# Patient Record
Sex: MALE | Race: WHITE | NOT HISPANIC OR LATINO | Employment: FULL TIME | URBAN - METROPOLITAN AREA
[De-identification: names, ages, dates, MRNs, and addresses within clinical notes are randomized per-mention and may not be internally consistent; named-entity substitution may affect disease eponyms.]

---

## 2019-12-10 ENCOUNTER — OFFICE VISIT (OUTPATIENT)
Dept: URGENT CARE | Age: 44
End: 2019-12-10
Payer: COMMERCIAL

## 2019-12-10 ENCOUNTER — HOSPITAL ENCOUNTER (EMERGENCY)
Facility: HOSPITAL | Age: 44
Discharge: HOME | End: 2019-12-10
Attending: EMERGENCY MEDICINE | Admitting: EMERGENCY MEDICINE
Payer: COMMERCIAL

## 2019-12-10 VITALS
WEIGHT: 177 LBS | RESPIRATION RATE: 20 BRPM | HEART RATE: 64 BPM | TEMPERATURE: 98.2 F | DIASTOLIC BLOOD PRESSURE: 67 MMHG | BODY MASS INDEX: 24.01 KG/M2 | OXYGEN SATURATION: 97 % | SYSTOLIC BLOOD PRESSURE: 114 MMHG

## 2019-12-10 VITALS
RESPIRATION RATE: 20 BRPM | TEMPERATURE: 98.5 F | DIASTOLIC BLOOD PRESSURE: 71 MMHG | HEIGHT: 72 IN | BODY MASS INDEX: 23.98 KG/M2 | OXYGEN SATURATION: 98 % | WEIGHT: 177 LBS | HEART RATE: 63 BPM | SYSTOLIC BLOOD PRESSURE: 122 MMHG

## 2019-12-10 DIAGNOSIS — J34.0 ABSCESS OF NASAL SEPTUM: Primary | ICD-10-CM

## 2019-12-10 DIAGNOSIS — R22.0 NASAL SWELLING: ICD-10-CM

## 2019-12-10 DIAGNOSIS — J34.0 CELLULITIS OF NOSE: Primary | ICD-10-CM

## 2019-12-10 DIAGNOSIS — R22.0 FACIAL SWELLING: ICD-10-CM

## 2019-12-10 DIAGNOSIS — K08.89 PAIN, DENTAL: ICD-10-CM

## 2019-12-10 DIAGNOSIS — R22.0 SWELLING OF GUMS: ICD-10-CM

## 2019-12-10 PROCEDURE — 90715 TDAP VACCINE 7 YRS/> IM: CPT | Performed by: EMERGENCY MEDICINE

## 2019-12-10 PROCEDURE — S9083 URGENT CARE CENTER GLOBAL: HCPCS | Performed by: PHYSICIAN ASSISTANT

## 2019-12-10 PROCEDURE — 99283 EMERGENCY DEPT VISIT LOW MDM: CPT | Performed by: EMERGENCY MEDICINE

## 2019-12-10 PROCEDURE — G0382 LEV 3 HOSP TYPE B ED VISIT: HCPCS | Performed by: PHYSICIAN ASSISTANT

## 2019-12-10 PROCEDURE — 90471 IMMUNIZATION ADMIN: CPT

## 2019-12-10 PROCEDURE — 99283 EMERGENCY DEPT VISIT LOW MDM: CPT

## 2019-12-10 RX ORDER — IBUPROFEN 400 MG/1
800 TABLET ORAL ONCE
Status: COMPLETED | OUTPATIENT
Start: 2019-12-10 | End: 2019-12-10

## 2019-12-10 RX ORDER — NAPROXEN 500 MG/1
500 TABLET ORAL 2 TIMES DAILY WITH MEALS
Qty: 20 TABLET | Refills: 0 | Status: SHIPPED | OUTPATIENT
Start: 2019-12-10 | End: 2019-12-20

## 2019-12-10 RX ORDER — CLINDAMYCIN HYDROCHLORIDE 150 MG/1
300 CAPSULE ORAL 4 TIMES DAILY
Qty: 56 CAPSULE | Refills: 0 | Status: SHIPPED | OUTPATIENT
Start: 2019-12-10 | End: 2019-12-17

## 2019-12-10 RX ORDER — CLINDAMYCIN HYDROCHLORIDE 150 MG/1
300 CAPSULE ORAL ONCE
Status: COMPLETED | OUTPATIENT
Start: 2019-12-10 | End: 2019-12-10

## 2019-12-10 RX ADMIN — CLINDAMYCIN HYDROCHLORIDE 300 MG: 150 CAPSULE ORAL at 23:43

## 2019-12-10 RX ADMIN — IBUPROFEN 800 MG: 400 TABLET ORAL at 23:45

## 2019-12-10 RX ADMIN — TETANUS TOXOID, REDUCED DIPHTHERIA TOXOID AND ACELLULAR PERTUSSIS VACCINE, ADSORBED 0.5 ML: 5; 2.5; 8; 8; 2.5 SUSPENSION INTRAMUSCULAR at 23:44

## 2019-12-11 NOTE — ED PROVIDER NOTES
History  Chief Complaint   Patient presents with    Nose Problem     Pt starting with a white popable area on the right side tip of nose  Pt then noticed redness and swelling around the area on the nose  The swelling goes into his lip/cheek and has pain into his teeth  Patient is a 45year old male who had a pimple on his nose a few days ago and popped it and since then has had increased swelling and redness and pain of distal nose  No fever  No N/V  No sob  ?last Td  Was last seen at [facility] today for nasal abscess and sent to ED for evaluation  Pain radiates to teeth  History provided by:  Patient, relative and spouse (daughter)   used: No        None       History reviewed  No pertinent past medical history  History reviewed  No pertinent surgical history  History reviewed  No pertinent family history  I have reviewed and agree with the history as documented  Social History     Tobacco Use    Smoking status: Current Every Day Smoker     Packs/day: 0 50     Types: Cigarettes    Smokeless tobacco: Never Used   Substance Use Topics    Alcohol use: Never     Frequency: Never    Drug use: Never        Review of Systems   Constitutional: Negative for fever  Respiratory: Negative for shortness of breath  Gastrointestinal: Negative for nausea and vomiting  Skin: Positive for color change  Physical Exam  Physical Exam   Constitutional: He is oriented to person, place, and time  He appears well-developed and well-nourished  He appears distressed (mild)  HENT:   Head: Normocephalic and atraumatic  Right Ear: External ear normal    Left Ear: External ear normal    Mouth/Throat: Oropharynx is clear and moist  No oropharyngeal exudate    (+) bilateral distal nasal swelling and erythema and induration and no distinct fluctuance  Eyes: Pupils are equal, round, and reactive to light  EOM are normal  No scleral icterus  Neck: Normal range of motion  Neck supple  No JVD present  No tracheal deviation present  Cardiovascular: Normal rate, regular rhythm and normal heart sounds  No murmur heard  Pulmonary/Chest: Effort normal and breath sounds normal  No respiratory distress  Neurological: He is alert and oriented to person, place, and time  Skin: Skin is warm and dry  There is erythema (distal bilateral nose/nares)  Nursing note and vitals reviewed  Vital Signs  ED Triage Vitals [12/10/19 2235]   Temperature Pulse Respirations Blood Pressure SpO2   98 2 °F (36 8 °C) 64 20 114/67 97 %      Temp Source Heart Rate Source Patient Position - Orthostatic VS BP Location FiO2 (%)   Oral Monitor Sitting Left arm --      Pain Score       Worst Possible Pain           Vitals:    12/10/19 2235   BP: 114/67   Pulse: 64   Patient Position - Orthostatic VS: Sitting         Visual Acuity      ED Medications  Medications   tetanus-diphtheria-acellular pertussis (BOOSTRIX) IM injection 0 5 mL (has no administration in time range)   ibuprofen (MOTRIN) tablet 800 mg (has no administration in time range)   clindamycin (CLEOCIN) capsule 300 mg (has no administration in time range)       Diagnostic Studies  Results Reviewed     None                 No orders to display              Procedures  Procedures         ED Course                               MDM  Number of Diagnoses or Management Options  Diagnosis management comments: DDX including but not limited to: cellulitis, lymphangitis, folliculitis, carbuncle, abscess; doubt rhabdomyolysis, necrotizing fasciitis, allergic reaction, angioedema          Amount and/or Complexity of Data Reviewed  Decide to obtain previous medical records or to obtain history from someone other than the patient: yes  Obtain history from someone other than the patient: yes  Review and summarize past medical records: yes          Disposition  Final diagnoses:   Cellulitis of nose - possible early abscess     Time reflects when diagnosis was documented in both MDM as applicable and the Disposition within this note     Time User Action Codes Description Comment    12/10/2019 11:30 PM Welby Seip Add [J34 0] Cellulitis of nose     12/10/2019 11:32 PM Welby Seip Modify [J34 0] Cellulitis of nose possible early abscess      ED Disposition     ED Disposition Condition Date/Time Comment    Discharge Stable Tue Dec 10, 2019 11:30 PM Artist Craven discharge to home/self care  Follow-up Information     Follow up With Specialties Details Why Contact Info    Phillip Gonzales MD Family Medicine Call in 2 days  [address] [facility]  [facility] [address] [place] [address]      Michelle Sanches MD Otolaryngology Call in 1 day Warm compresses three times a day  Return sooner if increased swelling, fever, vomiting, red streaks, difficulty swallowing or breathing, increased redness  [place] [address]  [phone]            Patient's Medications   Discharge Prescriptions    CLINDAMYCIN (CLEOCIN) 150 MG CAPSULE    Take 2 capsules (300 mg total) by mouth 4 (four) times a day for 7 days       Start Date: 12/10/2019End Date: 12/17/2019       Order Dose: 300 mg       Quantity: 56 capsule    Refills: 0    NAPROXEN (EC NAPROSYN) 500 MG EC TABLET    Take 1 tablet (500 mg total) by mouth 2 (two) times a day with meals for 10 days       Start Date: 12/10/2019End Date: 12/20/2019       Order Dose: 500 mg       Quantity: 20 tablet    Refills: 0     No discharge procedures on file      ED Provider  Electronically Signed by           Celena Becerra MD  12/10/19 0306

## 2019-12-11 NOTE — PATIENT INSTRUCTIONS
Patient would like to go via private vehicle to [facility] ER  Please go to the [facility] Emergency Department now for further evaluation and treatment- hospital address verified with the patient  Patient agreed to go immediately to the ED

## 2019-12-11 NOTE — PROGRESS NOTES
[facility]        NAME: Anant Leal is a 45 y o  male  : 1975    MRN: [id number]  DATE: December 10, 2019  TIME: 9:24 PM    Assessment and Plan   Abscess of nasal septum [J34 0]  1  Abscess of nasal septum  Transfer to other facility   2  Facial swelling  Transfer to other facility   3  Nasal swelling  Transfer to other facility   4  Pain, dental  Transfer to other facility   5  Swelling of gums  Transfer to other facility     Severe 10/10 nose, face and dental pain  Swelling and drainage  Concern for deep abscess as well as a facial cellulitis    Patient Instructions     Patient would like to go via private vehicle to [facility] ER  Please go to the [facility] Emergency Department now for further evaluation and treatment- hospital address verified with the patient  Patient agreed to go immediately to the ED  Chief Complaint     Chief Complaint   Patient presents with    Facial Pain     ingrown hair on nose and right upper lip and gums are swollen it's been since Yesterday   went to  dentist office and they referral to Oral Surgen   does not have an appointment  yet  History of Present Illness       53-year-old male presents with severe, 10/10 right-sided nose pain, gum pain and facial pain and swelling  He states on  he noticed a bump inside his right nose  States he tried warm compresses, states he has been having purulent drainage  He states the pain has worsened  States now the swelling and pain is over the right side of his face, cheek and right side of his upper lip/gum area  States he went to the dentist today and they referred him to the oral surgeon  Patient states he cannot wait to be seen as a did not have any appointments as he is in severe pain and the pain and swelling is worsening  Denies any fevers or chills  Notes redness to the right side of his nose and face that started today    Does note a history of dental problems and poor dentition  Denies any difficulty breathing or swallowing, chest pain, shortness of breath, GI/ symptoms or other complaints  Review of Systems   Review of Systems   Constitutional: Negative for activity change, appetite change, chills, fatigue and fever  HENT: Positive for dental problem, facial swelling and sinus pain  Negative for sore throat, trouble swallowing and voice change  Eyes: Negative for itching and visual disturbance  Respiratory: Negative for shortness of breath  Cardiovascular: Negative for chest pain  Gastrointestinal: Negative for abdominal pain, diarrhea, nausea and vomiting  Musculoskeletal: Negative for arthralgias and joint swelling  Skin: Positive for rash and wound  Neurological: Negative for dizziness, seizures, weakness, numbness and headaches  All other systems reviewed and are negative  Current Medications     No current outpatient medications on file  Current Allergies     Allergies as of 12/10/2019 - Reviewed 12/10/2019   Allergen Reaction Noted    Amoxicillin  03/22/2015            The following portions of the patient's history were reviewed and updated as appropriate: allergies, current medications, past family history, past medical history, past social history, past surgical history and problem list      No past medical history on file  No past surgical history on file  No family history on file  Medications have been verified  Objective   /71 (BP Location: Right arm, Patient Position: Sitting, Cuff Size: Standard)   Pulse 63   Temp 98 5 °F (36 9 °C) (Temporal)   Resp 20   Ht 6' (1 829 m)   Wt 80 3 kg (177 lb)   SpO2 98%   BMI 24 01 kg/m²        Physical Exam     Physical Exam   Constitutional: He is oriented to person, place, and time  He appears well-developed and well-nourished  No distress     HENT:   Head:       Right Ear: Tympanic membrane normal    Left Ear: Tympanic membrane normal    Nose: Sinus tenderness present  Right sinus exhibits maxillary sinus tenderness  Mouth/Throat: Uvula is midline and oropharynx is clear and moist  Abnormal dentition (Generalized, poor dentition  Missing multiple teeth  Erythematous and swollen gum line to right upper gums)  Dental caries present  No uvula swelling  No tongue/sublingual swelling or airway obstruction  Cardiovascular: Normal rate, regular rhythm and normal heart sounds  Pulmonary/Chest: Effort normal and breath sounds normal  He has no wheezes  Neurological: He is alert and oriented to person, place, and time  NV intact with sensation and strong peripheral pulses  5/5 strength of UE/LE bilaterally   Skin: Skin is warm and dry  Rash noted  Psychiatric: He has a normal mood and affect  His behavior is normal    Nursing note and vitals reviewed

## 2021-06-28 ENCOUNTER — OFFICE VISIT (OUTPATIENT)
Dept: URGENT CARE | Age: 46
End: 2021-06-28

## 2021-06-28 VITALS — RESPIRATION RATE: 18 BRPM | TEMPERATURE: 98.2 F | HEART RATE: 92 BPM | OXYGEN SATURATION: 96 %

## 2021-06-28 DIAGNOSIS — B34.9 ACUTE VIRAL SYNDROME: Primary | ICD-10-CM

## 2021-06-28 PROCEDURE — G0382 LEV 3 HOSP TYPE B ED VISIT: HCPCS | Performed by: NURSE PRACTITIONER

## 2021-06-28 RX ORDER — PREDNISONE 20 MG/1
20 TABLET ORAL 2 TIMES DAILY WITH MEALS
Qty: 6 TABLET | Refills: 0 | Status: SHIPPED | OUTPATIENT
Start: 2021-06-28 | End: 2021-07-01

## 2021-06-28 NOTE — LETTER
June 28, 2021     Patient: Alejandro Arceo   YOB: 1975   Date of Visit: 6/28/2021       To Whom it May Concern:    Alejandro Arceo was seen in my clinic on 6/28/2021  He may return to work 06/29/2021  If you have any questions or concerns, please don't hesitate to call           Sincerely,          ARVIND Tiwari        CC: No Recipients

## 2021-06-28 NOTE — PROGRESS NOTES
[facility]        NAME: David Jay is a 39 y o  male  : 1975    MRN: [id number]  DATE: 2021  TIME: 12:55 PM    Assessment and Plan   Acute viral syndrome [B34 9]  1  Acute viral syndrome  predniSONE 20 mg tablet         Patient Instructions     Take med as directed  Likely vital infection of upper resp tract  Tylenol prn for aches and pain  Follow up with PCP in 3-5 days  Proceed to  ER if symptoms worsen  Chief Complaint     Chief Complaint   Patient presents with    Cough     chill, and bodyaches, x saturday night     Sore Throat    Headache         History of Present Illness       HPI   Reports cough, chills, sore throat and headache, with bodyaches  Started 2 days ago  Had his covid vaccines  Last shot was may 18, 2021  No recent travel  No known contacts with sick persons  Works at a car [employer]  Smoker  Review of Systems   Review of Systems   Constitutional: Positive for chills  Negative for fever  HENT: Positive for sore throat  Negative for congestion, rhinorrhea and trouble swallowing  Respiratory: Positive for cough  Negative for chest tightness, shortness of breath and wheezing  Cardiovascular: Negative for chest pain  Gastrointestinal: Negative for diarrhea, nausea and vomiting  Musculoskeletal: Positive for myalgias  Skin: Negative for rash  Neurological: Positive for headaches  Negative for dizziness           Current Medications       Current Outpatient Medications:     naproxen (EC NAPROSYN) 500 MG EC tablet, Take 1 tablet (500 mg total) by mouth 2 (two) times a day with meals for 10 days, Disp: 20 tablet, Rfl: 0    predniSONE 20 mg tablet, Take 1 tablet (20 mg total) by mouth 2 (two) times a day with meals for 3 days, Disp: 6 tablet, Rfl: 0    Current Allergies     Allergies as of 2021 - Reviewed 2021   Allergen Reaction Noted    Amoxicillin  2015            The following portions of the patient's history were reviewed and updated as appropriate: allergies, current medications, past family history, past medical history, past social history, past surgical history and problem list      History reviewed  No pertinent past medical history  History reviewed  No pertinent surgical history  No family history on file  Medications have been verified  Objective   Pulse 92   Temp 98 2 °F (36 8 °C)   Resp 18   SpO2 96%   No LMP for male patient  Physical Exam     Physical Exam  Constitutional:       Appearance: He is not ill-appearing or diaphoretic  HENT:      Ears:      Comments: Increased cerumen in both ears     Mouth/Throat:      Mouth: Mucous membranes are moist       Pharynx: No pharyngeal swelling or posterior oropharyngeal erythema  Tonsils: No tonsillar exudate  0 on the right  0 on the left  Comments: Post nasal drip  Cardiovascular:      Rate and Rhythm: Regular rhythm  Pulmonary:      Effort: Pulmonary effort is normal       Breath sounds: Normal breath sounds  Lymphadenopathy:      Cervical: No cervical adenopathy  Neurological:      Mental Status: He is alert

## 2021-06-28 NOTE — PATIENT INSTRUCTIONS
Upper Respiratory Infection   WHAT YOU NEED TO KNOW:   An upper respiratory infection is also called a cold  It can affect your nose, throat, ears, and sinuses  Cold symptoms are usually worst for the first 3 to 5 days  Most people get better in 7 to 14 days  You may continue to cough for 2 to 3 weeks  Colds are caused by viruses and do not get better with antibiotics  DISCHARGE INSTRUCTIONS:   Call your local emergency number (911 in the [address] [facility] [facility]) if:   · You have chest pain or trouble breathing  Return to the emergency department if:   · You have a fever over 102ºF (39ºC)  Call your doctor if:   · You have a low fever  · Your sore throat gets worse or you see white or yellow spots in your throat  · Your symptoms get worse after 3 to 5 days or are not better in 14 days  · You have a rash anywhere on your skin  · You have large, tender lumps in your neck  · You have thick, green, or yellow drainage from your nose  · You cough up thick yellow, green, or bloody mucus  · You have a bad earache  · You have questions or concerns about your condition or care  Medicines: You may need any of the following:  · Decongestants  help reduce nasal congestion and help you breathe more easily  If you take decongestant pills, they may make you feel restless or cause problems with your sleep  Do not use decongestant sprays for more than a few days  · Cough suppressants  help reduce coughing  Ask your healthcare provider which type of cough medicine is best for you  · NSAIDs , such as ibuprofen, help decrease swelling, pain, and fever  NSAIDs can cause stomach bleeding or kidney problems in certain people  If you take blood thinner medicine, always ask your healthcare provider if NSAIDs are safe for you  Always read the medicine label and follow directions  · Acetaminophen  decreases pain and fever  It is available without a doctor's order  Ask how much to take and how often to take it  Follow directions  Read the labels of all other medicines you are using to see if they also contain acetaminophen, or ask your doctor or pharmacist  Acetaminophen can cause liver damage if not taken correctly  Do not use more than 4 grams (4,000 milligrams) total of acetaminophen in one day  · Take your medicine as directed  Contact your healthcare provider if you think your medicine is not helping or if you have side effects  Tell him or her if you are allergic to any medicine  Keep a list of the medicines, vitamins, and herbs you take  Include the amounts, and when and why you take them  Bring the list or the pill bottles to follow-up visits  Carry your medicine list with you in case of an emergency  Self-care:   · Rest as much as possible  Slowly start to do more each day  · Drink more liquids as directed  Liquids will help thin and loosen mucus so you can cough it up  Liquids will also help prevent dehydration  Liquids that help prevent dehydration include water, fruit juice, and broth  Do not drink liquids that contain caffeine  Caffeine can increase your risk for dehydration  Ask your healthcare provider how much liquid to drink each day  · Soothe a sore throat  Gargle with warm salt water  Make salt water by dissolving ¼ teaspoon salt in 1 cup warm water  You may also suck on hard candy or throat lozenges  You may use a sore throat spray  · Use a humidifier or vaporizer  Use a cool mist humidifier or a vaporizer to increase air moisture in your home  This may make it easier for you to breathe and help decrease your cough  · Use saline nasal drops as directed  These help relieve congestion  · Apply petroleum-based jelly around the outside of your nostrils  This can decrease irritation from blowing your nose  · Do not smoke  Nicotine and other chemicals in cigarettes and cigars can make your symptoms worse  They can also cause infections such as bronchitis or pneumonia   Ask your healthcare provider for information if you currently smoke and need help to quit  E-cigarettes or smokeless tobacco still contain nicotine  Talk to your healthcare provider before you use these products  Prevent a cold:   · Wash your hands often  Use soap and water every time you wash your hands  Rub your soapy hands together, lacing your fingers  Use the fingers of one hand to scrub under the nails of the other hand  Wash for at least 20 seconds  Rinse with warm, running water for several seconds  Then dry your hands  Use germ-killing gel if soap and water are not available  Do not touch your eyes or mouth without washing your hands first          · Cover a sneeze or cough  Use a tissue that covers your mouth and nose  Put the used tissue in the trash right away  Use the bend of your arm if a tissue is not available  Wash your hands well with soap and water or use a hand   Do not stand close to anyone who is sneezing or coughing  · Try to stay away from others while you are sick  This is especially important during the first 2 to 3 days when the virus is more easily spread  Wait until a fever, cough, or other symptoms are gone before you return to work or other regular activities  · Do not share items while you are sick  This includes food, drinks, eating utensils, and dishes  Follow up with your doctor as directed:  Write down your questions so you remember to ask them during your visits  © Copyright [address] Information is for End User's use only and may not be sold, redistributed or otherwise used for commercial purposes  All illustrations and images included in CareNotes® are the copyrighted property of A D A M , Inc  or [facility] [employer]oro [employer]   The above information is an  only  It is not intended as medical advice for individual conditions or treatments   Talk to your doctor, nurse or pharmacist before following any medical regimen to see if it is safe and effective for you

## 2021-07-03 ENCOUNTER — OFFICE VISIT (OUTPATIENT)
Dept: URGENT CARE | Age: 46
End: 2021-07-03
Payer: COMMERCIAL

## 2021-07-03 VITALS
HEART RATE: 72 BPM | SYSTOLIC BLOOD PRESSURE: 115 MMHG | DIASTOLIC BLOOD PRESSURE: 65 MMHG | TEMPERATURE: 98.7 F | RESPIRATION RATE: 16 BRPM | OXYGEN SATURATION: 99 %

## 2021-07-03 DIAGNOSIS — L50.9 HIVES: Primary | ICD-10-CM

## 2021-07-03 PROCEDURE — G0382 LEV 3 HOSP TYPE B ED VISIT: HCPCS | Performed by: NURSE PRACTITIONER

## 2021-07-03 RX ORDER — METHYLPREDNISOLONE 4 MG/1
TABLET ORAL
Qty: 1 EACH | Refills: 0 | Status: SHIPPED | OUTPATIENT
Start: 2021-07-03

## 2021-07-03 RX ORDER — METHYLPREDNISOLONE 4 MG/1
TABLET ORAL
Qty: 1 EACH | Refills: 0 | Status: SHIPPED | OUTPATIENT
Start: 2021-07-03 | End: 2021-07-03 | Stop reason: SDUPTHER

## 2021-07-03 NOTE — PROGRESS NOTES
[facility]        NAME: Suzi Mcmahon is a 42 y o  male  : 1975    MRN: [id number]  DATE: July 3, 2021  TIME: 3:15 PM    Assessment and Plan   Hives [L50 9]  1  Hives  methylPREDNISolone 4 MG tablet therapy pack    DISCONTINUED: methylPREDNISolone 4 MG tablet therapy pack         Patient Instructions     Take med as directed  If hives worsen, go to the ED  Follow up with PCP in 3-5 days  Proceed to  ER if symptoms worsen  Chief Complaint     Chief Complaint   Patient presents with    Urticaria     pt states started with rash on Thursday, now spreading, was put on steroids for viral infection, finished them on Wednesday         History of Present Illness       HPI   Reports rash all over body  Finished prednisone 3 days ago and hives occurred the next day  He has used benadryl; last night  Not sure what he may have been exposed to  No known food allergies  Allergy to amox  No new clothing, soap, or other suspicious possible irritants  Review of Systems   Review of Systems   Constitutional: Negative for chills and fever  HENT: Negative for sore throat and trouble swallowing  Respiratory: Negative for cough, shortness of breath and wheezing  Cardiovascular: Negative for chest pain  Gastrointestinal: Negative for vomiting  Skin: Positive for color change (red) and rash  Negative for wound  Neurological: Negative for dizziness, syncope, weakness, light-headedness and numbness           Current Medications       Current Outpatient Medications:     methylPREDNISolone 4 MG tablet therapy pack, Use as directed on package, Disp: 1 each, Rfl: 0    naproxen (EC NAPROSYN) 500 MG EC tablet, Take 1 tablet (500 mg total) by mouth 2 (two) times a day with meals for 10 days, Disp: 20 tablet, Rfl: 0    Current Allergies     Allergies as of 2021 - Reviewed 2021   Allergen Reaction Noted    Amoxicillin  2015            The following portions of the patient's history were reviewed and updated as appropriate: allergies, current medications, past family history, past medical history, past social history, past surgical history and problem list      History reviewed  No pertinent past medical history  History reviewed  No pertinent surgical history  History reviewed  No pertinent family history  Medications have been verified  Objective   /65   Pulse 72   Temp 98 7 °F (37 1 °C)   Resp 16   SpO2 99%   No LMP for male patient  Physical Exam     Physical Exam  Cardiovascular:      Rate and Rhythm: Regular rhythm  Heart sounds: Normal heart sounds  Pulmonary:      Effort: Pulmonary effort is normal       Breath sounds: Normal breath sounds  Skin:     Findings: Rash (flat, smooth, slight elevated wheel like lesions, consistent with hives  present on the legs, arms, trunks and neck) present  Neurological:      Mental Status: He is alert

## 2021-07-03 NOTE — PATIENT INSTRUCTIONS
Urticaria   WHAT YOU NEED TO KNOW:   Urticaria is also called hives  Hives can change size and shape, and appear anywhere on your skin  They can be mild or severe and last from a few minutes to a few days  Hives may be a sign of a severe allergic reaction called anaphylaxis that needs immediate treatment  Urticaria that lasts longer than 6 weeks may be a chronic condition that needs long-term treatment  DISCHARGE INSTRUCTIONS:   Call 911 for signs or symptoms of anaphylaxis,  such as trouble breathing, swelling in your mouth or throat, or wheezing  You may also have itching, a rash, or feel like you are going to faint  Return to the emergency department if:   · Your heart is beating faster than it normally does  · You have cramping or severe pain in your abdomen  Contact your healthcare provider if:   · You have a fever  · Your skin still itches 24 hours after you take your medicine  · You still have hives after 7 days  · Your joints are painful and swollen  · You have questions or concerns about your condition or care  Medicines:   · Epinephrine  is used to treat severe allergic reactions such as anaphylaxis  · Antihistamines  decrease mild symptoms such as itching or a rash  · Steroids  decrease redness, pain, and swelling  · Take your medicine as directed  Contact your healthcare provider if you think your medicine is not helping or if you have side effects  Tell him of her if you are allergic to any medicine  Keep a list of the medicines, vitamins, and herbs you take  Include the amounts, and when and why you take them  Bring the list or the pill bottles to follow-up visits  Carry your medicine list with you in case of an emergency  Steps to take for signs or symptoms of anaphylaxis:   · Immediately  give 1 shot of epinephrine only into the outer thigh muscle  · Leave the shot in place  as directed   Your healthcare provider may recommend you leave it in place for up to 10 seconds before you remove it  This helps make sure all of the epinephrine is delivered  · Call 911 and go to the emergency department,  even if the shot improved symptoms  Do not drive yourself  Bring the used epinephrine shot with you  Safety precautions to take if you are at risk for anaphylaxis:   · Keep 2 shots of epinephrine with you at all times  You may need a second shot, because epinephrine only works for about 20 minutes and symptoms may return  Your healthcare provider can show you and family members how to give the shot  Check the expiration date every month and replace it before it expires  · Create an action plan  Your healthcare provider can help you create a written plan that explains the allergy and an emergency plan to treat a reaction  The plan explains when to give a second epinephrine shot if symptoms return or do not improve after the first  Give copies of the action plan and emergency instructions to family members, work and school staff, and  providers  Show them how to give a shot of epinephrine  · Be careful when you exercise  If you have had exercise-induced anaphylaxis, do not exercise right after you eat  Stop exercising right away if you start to develop any signs or symptoms of anaphylaxis  You may first feel tired, warm, or have itchy skin  Hives, swelling, and severe breathing problems may develop if you continue to exercise  · Carry medical alert identification  Wear medical alert jewelry or carry a card that explains the allergy  Ask your healthcare provider where to get these items  · Keep a record of triggers and symptoms  Record everything you eat, drink, or apply to your skin for 3 weeks  Include stressful events and what you were doing right before your hives started  Bring the record with you to follow-up visits with your healthcare provider  Manage urticaria:   · Cool your skin  This may help decrease itching   Apply a cool pack to your hives  Dip a hand towel in cool water, wring it out, and place it on your hives  You may also soak your skin in a cool oatmeal bath  · Do not rub your hives  This can irritate your skin and cause more hives  · Wear loose clothing  Tight clothes may irritate your skin and cause more hives  · Manage stress  Stress may trigger hives, or make them worse  Learn new ways to relax, such as deep breathing  Follow up with your healthcare provider as directed:  Write down your questions so you remember to ask them during your visits  © Copyright [address] Information is for End User's use only and may not be sold, redistributed or otherwise used for commercial purposes  All illustrations and images included in CareNotes® are the copyrighted property of [employer]  or [facility] [employer]   The above information is an  only  It is not intended as medical advice for individual conditions or treatments  Talk to your doctor, nurse or pharmacist before following any medical regimen to see if it is safe and effective for you

## 2022-01-04 ENCOUNTER — OFFICE VISIT (OUTPATIENT)
Dept: URGENT CARE | Age: 47
End: 2022-01-04
Payer: COMMERCIAL

## 2022-01-04 VITALS — OXYGEN SATURATION: 99 % | TEMPERATURE: 99.4 F | RESPIRATION RATE: 20 BRPM | HEART RATE: 100 BPM

## 2022-01-04 DIAGNOSIS — R68.89 FLU-LIKE SYMPTOMS: Primary | ICD-10-CM

## 2022-01-04 PROCEDURE — 87636 SARSCOV2 & INF A&B AMP PRB: CPT | Performed by: NURSE PRACTITIONER

## 2022-01-04 PROCEDURE — G0382 LEV 3 HOSP TYPE B ED VISIT: HCPCS | Performed by: NURSE PRACTITIONER

## 2022-01-04 RX ORDER — OSELTAMIVIR PHOSPHATE 75 MG/1
75 CAPSULE ORAL 2 TIMES DAILY
Qty: 10 CAPSULE | Refills: 0 | Status: SHIPPED | OUTPATIENT
Start: 2022-01-04 | End: 2022-01-09

## 2022-01-04 NOTE — LETTER
January 4, 2022     Patient: Lamont Tse   YOB: 1975   Date of Visit: 1/4/2022       To Whom it May Concern:    Lamont Tse was seen in my clinic on 1/4/2022  He may not return to work until he is fever free for atleast 24 hrs, without taking fever medication  If you have any questions or concerns, please don't hesitate to call           Sincerely,          ARVIND Peace        CC: No Recipients

## 2022-01-05 NOTE — PROGRESS NOTES
[facility]        NAME: Silvino Solorio is a 42 y o  male  : 1975    MRN: [id number]  DATE: 2022  TIME: 8:50 PM    Assessment and Plan   Flu-like symptoms [R68 89]  1  Flu-like symptoms  oseltamivir (TAMIFLU) 75 mg capsule    Covid19 and INFLUENZA A/B PCR         Patient Instructions     Tamiflu for possible influenza  Covid and flu tested  Follow up with PCP in 3-5 days  Proceed to  ER if symptoms worsen  Chief Complaint     Chief Complaint   Patient presents with    Fever     pt states symptoms started this am, temp to 104 4  Pt c/o left ear pain  Pt denies sick contants, pt is vaccinated, did not get a flu vaccine    Chills    Earache    Eye Pain         History of Present Illness       HPI   Reports fever that started this morning  At its highest was 104 4 degrees  Also chills, earache, and burning eye pain in both eyes  No known contact with covid 19  Covid vaccinated x 2 injections  No flu vaccinated  Review of Systems   Review of Systems   Constitutional: Positive for chills and fever  HENT: Positive for ear pain and rhinorrhea  Negative for sore throat  Eyes: Positive for pain  Respiratory: Negative for cough, chest tightness, shortness of breath and wheezing  Cardiovascular: Negative for chest pain  Gastrointestinal: Negative for diarrhea and vomiting  Neurological: Negative for headaches           Current Medications       Current Outpatient Medications:     methylPREDNISolone 4 MG tablet therapy pack, Use as directed on package (Patient not taking: Reported on 2022 ), Disp: 1 each, Rfl: 0    naproxen (EC NAPROSYN) 500 MG EC tablet, Take 1 tablet (500 mg total) by mouth 2 (two) times a day with meals for 10 days, Disp: 20 tablet, Rfl: 0    oseltamivir (TAMIFLU) 75 mg capsule, Take 1 capsule (75 mg total) by mouth 2 (two) times a day for 5 days, Disp: 10 capsule, Rfl: 0    Current Allergies     Allergies as of 2022 - Reviewed 2022 Allergen Reaction Noted    Amoxicillin  03/22/2015            The following portions of the patient's history were reviewed and updated as appropriate: allergies, current medications, past family history, past medical history, past social history, past surgical history and problem list      History reviewed  No pertinent past medical history  History reviewed  No pertinent surgical history  History reviewed  No pertinent family history  Medications have been verified  Objective   Pulse 100   Temp 99 4 °F (37 4 °C)   Resp 20   SpO2 99%   No LMP for male patient  Physical Exam     Physical Exam  Constitutional:       Appearance: He is ill-appearing  He is not diaphoretic  HENT:      Mouth/Throat:      Comments: Dental caries and several missing teeth  Cardiovascular:      Rate and Rhythm: Regular rhythm  Heart sounds: Normal heart sounds  Pulmonary:      Effort: Pulmonary effort is normal       Breath sounds: Normal breath sounds  No wheezing

## 2022-01-09 LAB
FLUAV RNA RESP QL NAA+PROBE: NEGATIVE
FLUBV RNA RESP QL NAA+PROBE: NEGATIVE
SARS-COV-2 RNA RESP QL NAA+PROBE: POSITIVE

## 2022-09-19 ENCOUNTER — APPOINTMENT (OUTPATIENT)
Dept: PHYSICAL THERAPY | Age: 47
End: 2022-09-19

## 2022-09-19 ENCOUNTER — APPOINTMENT (OUTPATIENT)
Dept: URGENT CARE | Age: 47
End: 2022-09-19

## 2022-09-19 ENCOUNTER — APPOINTMENT (OUTPATIENT)
Dept: RADIOLOGY | Age: 47
End: 2022-09-19

## 2022-09-19 DIAGNOSIS — Z00.00 PHYSICAL EXAM: ICD-10-CM

## 2022-09-19 PROCEDURE — 72100 X-RAY EXAM L-S SPINE 2/3 VWS: CPT

## 2022-09-19 PROCEDURE — 97530 THERAPEUTIC ACTIVITIES: CPT | Performed by: SPECIALIST/TECHNOLOGIST

## 2024-10-10 ENCOUNTER — OFFICE VISIT (OUTPATIENT)
Dept: DENTISTRY | Facility: CLINIC | Age: 49
End: 2024-10-10

## 2024-10-10 DIAGNOSIS — K08.3 RETAINED TOOTH ROOT: Primary | ICD-10-CM

## 2024-10-10 DIAGNOSIS — Z72.0 TOBACCO USE: ICD-10-CM

## 2024-10-10 PROCEDURE — D0330 PANORAMIC RADIOGRAPHIC IMAGE: HCPCS

## 2024-10-10 PROCEDURE — D0140 LIMITED ORAL EVALUATION - PROBLEM FOCUSED: HCPCS

## 2024-10-10 NOTE — DENTAL PROCEDURE DETAILS
.Limited Exam    Daniel Palladino 49 y.o. male presents with self to [place] for Limited exam  PMH reviewed, no changes, ASA II. Significant medical history: patient states he is a smoker and smokes 7-8 cigarettes a day. Significant allergies: amoxicillin. Significant medications: none.    Chief complaint:  I had a referral for full mouth extractions but my referral  and I need a new one    Consent:  Discussed that limited exam focuses on problem area, and same day tx is not guaranteed.  Patient explained to if they wish to have anything else evaluated, they need to return to the practice at which they are a patient of record or schedule a comprehensive exam afterwards.  Patient understands and consent was given by self via verbal consent.    Subjective history:    Onset: months and months ago   Provocation: Cold, Hot, Biting, Chewing, Spontaneous.   Quality:  unable to describe .   Region: Patient cannot localize the pain.   Severity: /10.   Timing: constant.    Objective clinical findings:   Oral cancer screening: normal.   Extraoral exam: no remarkable findings.  Intraoral exam:  Bone exposed on the buccal of #10 and 11 . Redness of gingiva, multiple retained root tips     Radiographs: PAN.       Assessment:  Multiple retained roots with infections    Plan:   Patient would like full mouth extractions and is interested in complete conventional dentures or implant retained denture options. I told patient after he has extractions done we would need to set up a comprehensive exam to review a treatment plan and see if he is a candidate for implants. I told patient that since he is a smoker, he would be at higher risk for implant failure    Referral(s): [facility] for extractions 4, 6, 7, 8, 9, 10, 11, 12, 15, 22, 24, 27, 28  and alveoplasty to prepare for denture prosthesis  Rx: None.  Comprehensive care disposition: Patient denies being a patient of record anywhere and was encouraged to establish comprehensive dental  care somewhere, whether it be at one of the  dental clinics or another practice of choice.    Patient dismissed ambulatory and alert.    NV: Comprehensive exam.    Attending: Dr. Terry was present in clinic.